# Patient Record
Sex: MALE | Race: WHITE | Employment: OTHER | URBAN - METROPOLITAN AREA
[De-identification: names, ages, dates, MRNs, and addresses within clinical notes are randomized per-mention and may not be internally consistent; named-entity substitution may affect disease eponyms.]

---

## 2018-10-03 ENCOUNTER — ANESTHESIA EVENT (OUTPATIENT)
Dept: ENDOSCOPY | Age: 83
End: 2018-10-03
Payer: MEDICARE

## 2018-10-03 ENCOUNTER — HOSPITAL ENCOUNTER (OUTPATIENT)
Age: 83
Setting detail: OUTPATIENT SURGERY
Discharge: HOME | End: 2018-10-03
Attending: INTERNAL MEDICINE | Admitting: INTERNAL MEDICINE
Payer: MEDICARE

## 2018-10-03 ENCOUNTER — ANESTHESIA (OUTPATIENT)
Dept: ENDOSCOPY | Age: 83
End: 2018-10-03
Payer: MEDICARE

## 2018-10-03 VITALS
WEIGHT: 208 LBS | TEMPERATURE: 97.6 F | BODY MASS INDEX: 29.78 KG/M2 | SYSTOLIC BLOOD PRESSURE: 148 MMHG | HEIGHT: 70 IN | OXYGEN SATURATION: 97 % | RESPIRATION RATE: 16 BRPM | HEART RATE: 73 BPM | DIASTOLIC BLOOD PRESSURE: 81 MMHG

## 2018-10-03 VITALS — SYSTOLIC BLOOD PRESSURE: 127 MMHG | OXYGEN SATURATION: 95 % | DIASTOLIC BLOOD PRESSURE: 73 MMHG

## 2018-10-03 LAB
C DIFFICILE TOXIN, EIA: NORMAL
GLUCOSE BLD-MCNC: 89 MG/DL (ref 70–99)
PERFORMED ON: NORMAL

## 2018-10-03 PROCEDURE — 3609010300 HC COLONOSCOPY W/BIOPSY SINGLE/MULTIPLE: Performed by: INTERNAL MEDICINE

## 2018-10-03 PROCEDURE — 2500000003 HC RX 250 WO HCPCS: Performed by: INTERNAL MEDICINE

## 2018-10-03 PROCEDURE — 3700000001 HC ADD 15 MINUTES (ANESTHESIA): Performed by: INTERNAL MEDICINE

## 2018-10-03 PROCEDURE — 6370000000 HC RX 637 (ALT 250 FOR IP): Performed by: INTERNAL MEDICINE

## 2018-10-03 PROCEDURE — 2709999900 HC NON-CHARGEABLE SUPPLY: Performed by: INTERNAL MEDICINE

## 2018-10-03 PROCEDURE — 2500000003 HC RX 250 WO HCPCS: Performed by: NURSE ANESTHETIST, CERTIFIED REGISTERED

## 2018-10-03 PROCEDURE — 7100000010 HC PHASE II RECOVERY - FIRST 15 MIN: Performed by: INTERNAL MEDICINE

## 2018-10-03 PROCEDURE — 6360000002 HC RX W HCPCS: Performed by: NURSE ANESTHETIST, CERTIFIED REGISTERED

## 2018-10-03 PROCEDURE — 7100000011 HC PHASE II RECOVERY - ADDTL 15 MIN: Performed by: INTERNAL MEDICINE

## 2018-10-03 PROCEDURE — 87449 NOS EACH ORGANISM AG IA: CPT

## 2018-10-03 PROCEDURE — 2580000003 HC RX 258: Performed by: NURSE ANESTHETIST, CERTIFIED REGISTERED

## 2018-10-03 PROCEDURE — 2580000003 HC RX 258: Performed by: FAMILY MEDICINE

## 2018-10-03 PROCEDURE — 88305 TISSUE EXAM BY PATHOLOGIST: CPT

## 2018-10-03 PROCEDURE — 3700000000 HC ANESTHESIA ATTENDED CARE: Performed by: INTERNAL MEDICINE

## 2018-10-03 PROCEDURE — 87324 CLOSTRIDIUM AG IA: CPT

## 2018-10-03 RX ORDER — AMLODIPINE BESYLATE 10 MG/1
10 TABLET ORAL DAILY
COMMUNITY
End: 2019-06-04 | Stop reason: ALTCHOICE

## 2018-10-03 RX ORDER — LIDOCAINE HYDROCHLORIDE 20 MG/ML
INJECTION, SOLUTION INFILTRATION; PERINEURAL PRN
Status: DISCONTINUED | OUTPATIENT
Start: 2018-10-03 | End: 2018-10-03 | Stop reason: SDUPTHER

## 2018-10-03 RX ORDER — SODIUM CHLORIDE 9 MG/ML
INJECTION, SOLUTION INTRAVENOUS CONTINUOUS PRN
Status: DISCONTINUED | OUTPATIENT
Start: 2018-10-03 | End: 2018-10-03 | Stop reason: SDUPTHER

## 2018-10-03 RX ORDER — SODIUM CHLORIDE 9 MG/ML
INJECTION, SOLUTION INTRAVENOUS CONTINUOUS
Status: DISCONTINUED | OUTPATIENT
Start: 2018-10-03 | End: 2018-10-03 | Stop reason: HOSPADM

## 2018-10-03 RX ORDER — PROPOFOL 10 MG/ML
INJECTION, EMULSION INTRAVENOUS PRN
Status: DISCONTINUED | OUTPATIENT
Start: 2018-10-03 | End: 2018-10-03 | Stop reason: SDUPTHER

## 2018-10-03 RX ADMIN — SODIUM CHLORIDE: 9 INJECTION, SOLUTION INTRAVENOUS at 10:15

## 2018-10-03 RX ADMIN — LIDOCAINE HYDROCHLORIDE 50 MG: 20 INJECTION, SOLUTION INFILTRATION; PERINEURAL at 10:33

## 2018-10-03 RX ADMIN — PROPOFOL 100 MG: 10 INJECTION, EMULSION INTRAVENOUS at 10:33

## 2018-10-03 RX ADMIN — LIDOCAINE HYDROCHLORIDE 25 MG: 20 INJECTION, SOLUTION INFILTRATION; PERINEURAL at 10:35

## 2018-10-03 RX ADMIN — PROPOFOL 50 MG: 10 INJECTION, EMULSION INTRAVENOUS at 10:35

## 2018-10-03 RX ADMIN — SODIUM CHLORIDE: 9 INJECTION, SOLUTION INTRAVENOUS at 10:20

## 2018-10-03 ASSESSMENT — PAIN - FUNCTIONAL ASSESSMENT: PAIN_FUNCTIONAL_ASSESSMENT: 0-10

## 2018-10-03 ASSESSMENT — PAIN SCALES - GENERAL
PAINLEVEL_OUTOF10: 0

## 2018-10-03 NOTE — ANESTHESIA PRE PROCEDURE
Department of Anesthesiology  Preprocedure Note       Name:  Javy Barrera   Age:  82 y.o.  :  1935                                          MRN:  [id number]         Date:  10/3/2018      Surgeon: Sarah Singleton):  Sudhir Monge MD    Procedure: Procedure(s):  COLONOSCOPY    Medications prior to admission:   Prior to Admission medications    Medication Sig Start Date End Date Taking? Authorizing Provider   Dihydroxyaluminum Sod Carb (ROLAIDS PO) Take  by mouth. Historical Provider, MD   aspirin 81 MG EC tablet Take 81 mg by mouth daily. Last dose 13    Historical Provider, MD   mesalamine (ASACOL) 400 MG EC tablet Take 800 mg by mouth 3 times daily. Historical Provider, MD       Current medications:    No current facility-administered medications for this encounter.         Allergies:  No Known Allergies    Problem List:    Patient Active Problem List   Diagnosis Code    Diastasis recti O98.65    Umbilical hernia G36.0       Past Medical History:        Diagnosis Date    Cancer ([address]     bladder    Colitis     Diabetes mellitus ([address]     pre-diabetic    GERD (gastroesophageal reflux disease)     Hypertension        Past Surgical History:        Procedure Laterality Date    COLONOSCOPY      CYSTOSCOPY  2010    TUR OF BLADDER TUMOR    CYSTOSCOPY  11-10-10    bladder biopsy with fulguration and cytology    CYSTOSCOPY  2011    with bladder biopsy & fulgeration    CYSTOSCOPY  12    cytology, bladder biopsy with fulguration    CYSTOSCOPY  12    bladder biospy and fulguration    CYSTOSCOPY  13    cytology    CYSTOSCOPY  2014    digital rectal exam    SKIN BIOPSY      mole removed 30 yrs ago    TONSILLECTOMY         Social History:    Social History   Substance Use Topics    Smoking status: Former Smoker     Packs/day: 0.50     Years: 15.00     Quit date: 1980    Smokeless tobacco: Never Used    Alcohol use No                                Counseling given: Not Answered      Vital Signs (Current): There were no vitals filed for this visit. BP Readings from Last 3 Encounters:   04/09/14 133/76   02/07/13 132/85   02/04/13 130/70       NPO Status:                                                                                 BMI:   Wt Readings from Last 3 Encounters:   09/18/18 208 lb (94.3 kg)   04/04/14 212 lb (96.2 kg)   02/07/13 219 lb 5.7 oz (99.5 kg)     There is no height or weight on file to calculate BMI.    CBC:   Lab Results   Component Value Date    WBC 6.8 06/29/2011    RBC 4.57 06/29/2011    HGB 13.3 06/29/2011    HCT 40.0 06/29/2011    MCV 87.5 06/29/2011    RDW 17.9 06/29/2011     06/29/2011       CMP:   Lab Results   Component Value Date     06/29/2011    K 4.8 06/29/2011     06/29/2011    CO2 28 06/29/2011    BUN 18 06/29/2011    CREATININE 1.0 06/29/2011    GFRAA >60 06/29/2011    AGRATIO 1.5 06/29/2011    GLUCOSE 98 06/29/2011    PROT 6.6 06/29/2011    CALCIUM 9.0 06/29/2011    BILITOT 0.40 06/29/2011    ALKPHOS 57 06/29/2011    AST 20 06/29/2011    ALT 28 06/29/2011       POC Tests: No results for input(s): POCGLU, POCNA, POCK, POCCL, POCBUN, POCHEMO, POCHCT in the last 72 hours. Coags: No results found for: PROTIME, INR, APTT    HCG (If Applicable): No results found for: PREGTESTUR, PREGSERUM, HCG, HCGQUANT     ABGs: No results found for: PHART, PO2ART, RBG0YBI, RFE8VDL, BEART, Q0ZXOYUV     Type & Screen (If Applicable):  No results found for: LABABO, LABRH    Anesthesia Evaluation    Airway: Mallampati: II  TM distance: >3 FB   Neck ROM: full  Mouth opening: > = 3 FB Dental:          Pulmonary:                              Cardiovascular:    (+) hypertension:,         Rhythm: regular  Rate: normal                    Neuro/Psych:   (+) neuromuscular disease:,             GI/Hepatic/Renal:   (+) GERD:,           Endo/Other:    (+) Diabetes, .                  Abdominal:   (+)

## 2018-10-03 NOTE — PROGRESS NOTES
Name:  Rachel Chavis  Age:  82 y.o.  CSN:  [id number]            Past Medical History:        Diagnosis Date    Cancer [facility])     bladder    Colitis     Diabetes mellitus ([address]     pre-diabetic    GERD (gastroesophageal reflux disease)     Hypertension        Past Surgical History:      Procedure Laterality Date    COLONOSCOPY      CYSTOSCOPY  07/01/2010    TUR OF BLADDER TUMOR    CYSTOSCOPY  11-10-10    bladder biopsy with fulguration and cytology    CYSTOSCOPY  7/06/2011    with bladder biopsy & fulgeration    CYSTOSCOPY  1-12-12    cytology, bladder biopsy with fulguration    CYSTOSCOPY  8/9/12    bladder biospy and fulguration    CYSTOSCOPY  2/7/13    cytology    CYSTOSCOPY  4/9/2014    digital rectal exam    SKIN BIOPSY      mole removed 30 yrs ago    TONSILLECTOMY         Medications Prior to Admission:  Prescriptions Prior to Admission: amLODIPine (NORVASC) 10 MG tablet, Take 10 mg by mouth daily  Dihydroxyaluminum Sod Carb (ROLAIDS PO), Take  by mouth.    mesalamine (ASACOL) 400 MG EC tablet, Take 750 mg by mouth 3 times daily     Allergies:  Patient has no known allergies. Social History:  Social History     Social History    Marital status:      Spouse name: N/A    Number of children: N/A    Years of education: N/A     Occupational History    Not on file.      Social History Main Topics    Smoking status: Former Smoker     Packs/day: 0.50     Years: 15.00     Quit date: 6/28/1980    Smokeless tobacco: Never Used    Alcohol use No    Drug use: No    Sexual activity: Not on file     Other Topics Concern    Not on file     Social History Narrative    No narrative on file       Family History:  Family History   Problem Relation Age of Onset    Cancer Father     Cancer Brother

## 2018-12-13 ENCOUNTER — OFFICE VISIT (OUTPATIENT)
Dept: ORTHOPEDIC SURGERY | Age: 83
End: 2018-12-13
Payer: MEDICARE

## 2018-12-13 VITALS
HEIGHT: 70 IN | BODY MASS INDEX: 29.92 KG/M2 | DIASTOLIC BLOOD PRESSURE: 86 MMHG | WEIGHT: 209 LBS | SYSTOLIC BLOOD PRESSURE: 155 MMHG | HEART RATE: 69 BPM

## 2018-12-13 DIAGNOSIS — M25.561 RIGHT KNEE PAIN, UNSPECIFIED CHRONICITY: Primary | ICD-10-CM

## 2018-12-13 PROCEDURE — 20610 DRAIN/INJ JOINT/BURSA W/O US: CPT | Performed by: ORTHOPAEDIC SURGERY

## 2018-12-13 PROCEDURE — 99203 OFFICE O/P NEW LOW 30 MIN: CPT | Performed by: ORTHOPAEDIC SURGERY

## 2018-12-13 RX ORDER — TRIAMCINOLONE ACETONIDE 40 MG/ML
40 INJECTION, SUSPENSION INTRA-ARTICULAR; INTRAMUSCULAR ONCE
Status: COMPLETED | OUTPATIENT
Start: 2018-12-13 | End: 2018-12-13

## 2018-12-13 RX ORDER — TADALAFIL 20 MG/1
20 TABLET ORAL
COMMUNITY
Start: 2018-11-05

## 2018-12-13 RX ORDER — AMOXICILLIN 500 MG/1
CAPSULE ORAL
COMMUNITY
Start: 2018-12-04 | End: 2019-06-04 | Stop reason: ALTCHOICE

## 2018-12-13 RX ADMIN — TRIAMCINOLONE ACETONIDE 40 MG: 40 INJECTION, SUSPENSION INTRA-ARTICULAR; INTRAMUSCULAR at 13:25

## 2018-12-13 NOTE — PROGRESS NOTES
CHIEF COMPLAINT: Right knee pain. History:   Zonia Reveles is a 82 y.o. male self-referred for evaluation and treatment of left knee pain / injury. The patient complains of left knee pain. This is evaluated as a personal injury. There was not a history of injury. The pain began 1-2 months ago. The pain is located medial. He describes the symptoms as sharp. He rates pain at 7/10. Symptoms improve with advil. The symptoms are worse with sudden twisting/turning motions. The knee has not given out or felt unstable. The patient can bend and straighten the knee fully. There is no swelling in the knee. There was not catching / locking of the knee. The patient has not had PT. The patient has not had an injection. The patient has taken NSAIDs. The patient has not tried ice. The patient's occupation is retired. Outside reports reviewed: none.     Past Medical History:   Diagnosis Date    Cancer [facility])     bladder    Colitis     Diabetes mellitus [address]     pre-diabetic    GERD (gastroesophageal reflux disease)     Hypertension        Past Surgical History:   Procedure Laterality Date    COLONOSCOPY      COLONOSCOPY  10/3/2018    COLONOSCOPY WITH BIOPSY performed by Bridget Laura MD at [address]  07/01/2010    TUR OF BLADDER TUMOR    CYSTOSCOPY  11-10-10    bladder biopsy with fulguration and cytology    CYSTOSCOPY  7/06/2011    with bladder biopsy & fulgeration    CYSTOSCOPY  1-12-12    cytology, bladder biopsy with fulguration    CYSTOSCOPY  8/9/12    bladder biospy and fulguration    CYSTOSCOPY  2/7/13    cytology    CYSTOSCOPY  4/9/2014    digital rectal exam    SKIN BIOPSY      mole removed 30 yrs ago    TONSILLECTOMY         Family History   Problem Relation Age of Onset    Cancer Father     Cancer Brother        Social History     Social History    Marital status:      Spouse name: N/A    Number of children: N/A    Years of education: N/A     Social History Main Topics    Smoking status: Former Smoker     Packs/day: 0.50     Years: 15.00     Quit date: 6/28/1980    Smokeless tobacco: Never Used    Alcohol use No    Drug use: No    Sexual activity: Not Asked     Other Topics Concern    None     Social History Narrative    None       Current Outpatient Prescriptions   Medication Sig Dispense Refill    tadalafil (CIALIS) 20 MG tablet Take 20 mg by mouth      amoxicillin (AMOXIL) 500 MG capsule       amLODIPine (NORVASC) 10 MG tablet Take 10 mg by mouth daily      Dihydroxyaluminum Sod Carb (ROLAIDS PO) Take  by mouth.  mesalamine (ASACOL) 400 MG EC tablet Take 750 mg by mouth 3 times daily        No current facility-administered medications for this visit. No Known Allergies    Review of Systems:  Pertinent items are noted in HPI. 13 point review of systems from Patient History Form dated 12/13/18 and available in the patient's chart under the Media tab. Physical Examination:     Vital signs:   BP (!) 155/86   Pulse 69   Ht 5' 9.5\" (1.765 m)   Wt 209 lb (94.8 kg)   BMI 30.42 kg/m²     General:  alert, appears stated age, cooperative and no distress   Gait:  Normal. The patient can bear weight on the injured extremity. Right Knee  Alignment:  neutral   ROM:  0 degrees extension to 120 degrees flexion   Bilateral knees   Crepitus:  no   Joint Tenderness:  medial joint line   Effusion:   0 cc   Patellar excursion:  2 of 4 quadrants    Patellar tilt test:  negative   Patellar facet tenderness:  negative medial   negative lateral   Trochlear tenderness:  negative   Anterior drawer test:  negative   Posterior drawer:   negative   Varus laxity at 30 degrees:  negative   Left knee: negative   Valgus laxity at 30 degrees:   negative   Left knee: negative   You's test:  positive   Elizabeth's test:  positive     There is not any cellulitis, lymphedema or cutaneous lesions noted in the lower extremities.  Motor exam of the lower

## 2019-05-28 ENCOUNTER — TELEPHONE (OUTPATIENT)
Dept: CARDIOLOGY CLINIC | Age: 84
End: 2019-05-28

## 2019-05-28 NOTE — TELEPHONE ENCOUNTER
Pt calling wanting to know if he needs to have his 06/04/19 appt moved up based on his CT Scan? Pt is going to drop a copy of the scan off for LES to look over.  Pls call to advise Thank you

## 2019-06-04 ENCOUNTER — OFFICE VISIT (OUTPATIENT)
Dept: CARDIOLOGY CLINIC | Age: 84
End: 2019-06-04
Payer: MEDICARE

## 2019-06-04 VITALS
BODY MASS INDEX: 30.96 KG/M2 | SYSTOLIC BLOOD PRESSURE: 156 MMHG | WEIGHT: 209 LBS | RESPIRATION RATE: 16 BRPM | HEIGHT: 69 IN | DIASTOLIC BLOOD PRESSURE: 74 MMHG | HEART RATE: 80 BPM

## 2019-06-04 DIAGNOSIS — I10 HYPERTENSION, UNSPECIFIED TYPE: ICD-10-CM

## 2019-06-04 DIAGNOSIS — R60.9 EDEMA, UNSPECIFIED TYPE: ICD-10-CM

## 2019-06-04 DIAGNOSIS — R01.1 MURMUR, CARDIAC: ICD-10-CM

## 2019-06-04 DIAGNOSIS — R06.02 SHORTNESS OF BREATH: Primary | ICD-10-CM

## 2019-06-04 DIAGNOSIS — E78.00 PURE HYPERCHOLESTEROLEMIA: ICD-10-CM

## 2019-06-04 DIAGNOSIS — R93.1 HIGH CORONARY ARTERY CALCIUM SCORE: ICD-10-CM

## 2019-06-04 PROCEDURE — 99203 OFFICE O/P NEW LOW 30 MIN: CPT | Performed by: INTERNAL MEDICINE

## 2019-06-04 RX ORDER — HYDROCHLOROTHIAZIDE 25 MG/1
25 TABLET ORAL DAILY PRN
Status: ON HOLD | COMMUNITY
Start: 2019-04-18 | End: 2020-01-14 | Stop reason: ALTCHOICE

## 2019-06-04 RX ORDER — AMLODIPINE BESYLATE 10 MG/1
10 TABLET ORAL NIGHTLY
COMMUNITY
Start: 2019-01-24

## 2019-06-04 NOTE — PROGRESS NOTES
engorged  · The carotid upstroke is normal in amplitude and contour without delay or bruit  · Peripheral pulses are symmetrical and full  · There is no clubbing, cyanosis of the extremities. · JAIME ankle edema, left worse than right   · Femoral Arteries: 2+ and equal  · Pedal Pulses: 2+ and equal   Abdomen:  · No masses or tenderness  · Liver/Spleen: No Abnormalities Noted  Neurological/Psychiatric:  · Alert and oriented in all spheres  · Moves all extremities well  · Exhibits normal gait balance and coordination  · No abnormalities of mood, affect, memory, mentation, or behavior are noted      Assessment:    Elevated CT Calcium Score   -order stress echo       Hypertension  Blood pressure (!) 156/74, pulse 80, resp. rate 16, height 5' 9\" (1.753 m), weight 209 lb (94.8 kg). stable    Diabetes   Managed by PCP         Plan:    Lab results reviewed this visit and discussed. Stress Echo   Continue risk factor modifications. Call for any change in symptoms. Return for regular follow up in 3 months. I appreciate the opportunity of cooperating in the care of this individual.    Madhuri Escalera. Christiano Modi M.D., [address] attestation: This note was scribed in the presence of Dr. Christiano Modi MD, by Christopher Darby RN. The scribe's documentation has been prepared under my direction and personally reviewed by me in its entirety. I confirm that the note above accurately reflects all work, treatment, procedures, and medical decision making performed by me.

## 2019-07-01 ENCOUNTER — HOSPITAL ENCOUNTER (OUTPATIENT)
Dept: NON INVASIVE DIAGNOSTICS | Age: 84
Discharge: HOME | End: 2019-07-01
Payer: MEDICARE

## 2019-07-01 DIAGNOSIS — R60.9 EDEMA, UNSPECIFIED TYPE: ICD-10-CM

## 2019-07-01 DIAGNOSIS — R06.02 SHORTNESS OF BREATH: ICD-10-CM

## 2019-07-01 DIAGNOSIS — R93.1 HIGH CORONARY ARTERY CALCIUM SCORE: ICD-10-CM

## 2019-07-01 DIAGNOSIS — I10 HYPERTENSION, UNSPECIFIED TYPE: ICD-10-CM

## 2019-07-01 DIAGNOSIS — R01.1 MURMUR, CARDIAC: ICD-10-CM

## 2019-07-01 LAB
LV EF: 50 %
LVEF MODALITY: NORMAL

## 2019-07-01 PROCEDURE — 93351 STRESS TTE COMPLETE: CPT

## 2019-07-01 PROCEDURE — 93320 DOPPLER ECHO COMPLETE: CPT

## 2019-07-03 ENCOUNTER — TELEPHONE (OUTPATIENT)
Dept: CARDIOLOGY CLINIC | Age: 84
End: 2019-07-03

## 2019-12-19 NOTE — PROGRESS NOTES
DATE_1/7/2020_______ TIME_0730_______ARRIVAL_0600________      Nothing to eat or drink after midnight the night before,except for what the prep instructions call for. If you do not have the instructions or do not understand them please contact your doctors office. Follow any instructions your doctors office has given you including what medications to take the AM of your procedure and which ones to hold. You may use your inhalers. If you take a long acting insulin the gale prior please cut the dose in half and take no diabetic medications that AM.Follow specific doctors office instructions regarding blood thinners and if they want you to hold and for how long. If you are on a blood thinner and have no instructions please contact the office and ask. Dress comfortably,bring your insurance card,picture ID,and a complete list of medications, including supplements. You must have a responsible adult to stay with you during the procedure,drive you home and stay with you. [facility] phone number [phone] for any questions.

## 2020-01-07 ENCOUNTER — HOSPITAL ENCOUNTER (OUTPATIENT)
Age: 85
Setting detail: OUTPATIENT SURGERY
Discharge: HOME | End: 2020-01-07
Attending: INTERNAL MEDICINE | Admitting: INTERNAL MEDICINE
Payer: MEDICARE

## 2020-01-07 VITALS
HEIGHT: 70 IN | WEIGHT: 205 LBS | OXYGEN SATURATION: 97 % | TEMPERATURE: 97.8 F | HEART RATE: 75 BPM | SYSTOLIC BLOOD PRESSURE: 136 MMHG | BODY MASS INDEX: 29.35 KG/M2 | RESPIRATION RATE: 18 BRPM | DIASTOLIC BLOOD PRESSURE: 91 MMHG

## 2020-01-07 PROCEDURE — 2580000003 HC RX 258: Performed by: ANESTHESIOLOGY

## 2020-01-07 RX ORDER — SODIUM CHLORIDE 0.9 % (FLUSH) 0.9 %
10 SYRINGE (ML) INJECTION PRN
Status: DISCONTINUED | OUTPATIENT
Start: 2020-01-07 | End: 2020-01-08 | Stop reason: HOSPADM

## 2020-01-07 RX ORDER — SODIUM CHLORIDE 9 MG/ML
INJECTION, SOLUTION INTRAVENOUS CONTINUOUS
Status: DISCONTINUED | OUTPATIENT
Start: 2020-01-07 | End: 2020-01-08 | Stop reason: HOSPADM

## 2020-01-07 RX ORDER — SODIUM CHLORIDE 0.9 % (FLUSH) 0.9 %
10 SYRINGE (ML) INJECTION EVERY 12 HOURS SCHEDULED
Status: DISCONTINUED | OUTPATIENT
Start: 2020-01-07 | End: 2020-01-08 | Stop reason: HOSPADM

## 2020-01-07 RX ORDER — LIDOCAINE HYDROCHLORIDE 10 MG/ML
1 INJECTION, SOLUTION EPIDURAL; INFILTRATION; INTRACAUDAL; PERINEURAL
Status: DISCONTINUED | OUTPATIENT
Start: 2020-01-07 | End: 2020-01-07 | Stop reason: HOSPADM

## 2020-01-07 RX ADMIN — SODIUM CHLORIDE: 9 INJECTION, SOLUTION INTRAVENOUS at 06:25

## 2020-01-07 ASSESSMENT — PAIN - FUNCTIONAL ASSESSMENT: PAIN_FUNCTIONAL_ASSESSMENT: 0-10

## 2020-01-07 NOTE — PROGRESS NOTES
Name:  Armaan Genao  Age:  82 y.o.  CSN:  [id number]            Past Medical History:        Diagnosis Date    Cancer [facility])     bladder    Colitis     GERD (gastroesophageal reflux disease)     Hypertension        Past Surgical History:      Procedure Laterality Date    COLONOSCOPY      COLONOSCOPY  10/3/2018    COLONOSCOPY WITH BIOPSY [address] Ovidio Francis MD [address]  07/01/2010    TUR OF BLADDER TUMOR    CYSTOSCOPY  11-10-10    bladder biopsy with fulguration and cytology    CYSTOSCOPY  7/06/2011    with bladder biopsy & fulgeration    CYSTOSCOPY  1-12-12    cytology, bladder biopsy with fulguration    CYSTOSCOPY  8/9/12    bladder biospy and fulguration    CYSTOSCOPY  2/7/13    cytology    CYSTOSCOPY  4/9/2014    digital rectal exam    SKIN BIOPSY      mole removed 30 yrs ago    TONSILLECTOMY         Medications Prior to Admission:  Medications Prior to Admission: hydrochlorothiazide (HYDRODIURIL) 25 MG tablet, Take 25 mg by mouth daily as needed   amLODIPine (NORVASC) 10 MG tablet, Take 10 mg by mouth nightly  tadalafil (CIALIS) 20 MG tablet, Take 20 mg by mouth  AzaTHIOprine (IMURAN PO), Take 50 mg by mouth daily   Dihydroxyaluminum Sod Carb (ROLAIDS PO), Take  by mouth. Allergies:  Patient has no known allergies.     Social History:  Social History     Socioeconomic History    Marital status:      Spouse name: Not on file    Number of children: Not on file    Years of education: Not on file    Highest education level: Not on file   Occupational History    Occupation: Retired   Social Needs    Financial resource strain: Not on file    Food insecurity:     Worry: Not on file     Inability: Not on file   [employer] needs:     Medical: Not on file     Non-medical: Not on file   Tobacco Use    Smoking status: Former Smoker     Packs/day: 0.50     Years: 15.00     Pack years: 7.50     Last attempt to quit: 6/28/1980     Years since quitting:

## 2020-01-08 ENCOUNTER — ANESTHESIA EVENT (OUTPATIENT)
Dept: ENDOSCOPY | Age: 85
End: 2020-01-08
Payer: MEDICARE

## 2020-01-08 ENCOUNTER — OFFICE VISIT (OUTPATIENT)
Dept: CARDIOLOGY CLINIC | Age: 85
End: 2020-01-08
Payer: MEDICARE

## 2020-01-08 VITALS
WEIGHT: 209 LBS | SYSTOLIC BLOOD PRESSURE: 152 MMHG | HEART RATE: 71 BPM | BODY MASS INDEX: 30.96 KG/M2 | DIASTOLIC BLOOD PRESSURE: 72 MMHG | HEIGHT: 69 IN | OXYGEN SATURATION: 93 %

## 2020-01-08 PROCEDURE — 99214 OFFICE O/P EST MOD 30 MIN: CPT | Performed by: NURSE PRACTITIONER

## 2020-01-08 NOTE — COMMUNICATION BODY
[facility]     Outpatient Follow Up Note    Mariluz Cedillo is 82 y.o. male who presents today with a history of DOZIER with positive calcium score and HTN      CHIEF COMPLAINT / HPI:  Follow Up secondary to positive calcium score with stress echo: neg / normal study    Subjective:   he denies significant chest pain. There is no SOB/DOZIER. The patient denies orthopnea/PND. The patient has swelling in his ankles at times for which he takes HCTZ prn. He hasn't used any in about 2 weeks. The patients weight is down 3#. The patient is not experiencing palpitations or dizziness. These symptoms show no change since the last OV. His home BP runs 142-145/80-85  With regard to medication therapy the patient has been compliant with prescribed regimen. They have tolerated therapy to date.      Current Outpatient Medications   Medication Sig Dispense Refill    amLODIPine (NORVASC) 10 MG tablet Take 10 mg by mouth nightly      tadalafil (CIALIS) 20 MG tablet Take 20 mg by mouth      AzaTHIOprine (IMURAN PO) Take 50 mg by mouth daily       Dihydroxyaluminum Sod Carb (ROLAIDS PO) Take 1 tablet by mouth 2 times daily as needed       hydrochlorothiazide (HYDRODIURIL) 25 MG tablet Take 25 mg by mouth daily as needed            Objective:   PHYSICAL EXAM:    Vitals:    01/08/20 1352 01/08/20 1400 01/08/20 1404 01/08/20 1422   BP: (!) 150/70 (!) 150/80 (!) 140/80 (!) 152/72   Site: Right Upper Arm Right Upper Arm Left Upper Arm    Position: Sitting Sitting Sitting    Cuff Size: Medium Adult Medium Adult Medium Adult    Pulse: 71 71     SpO2: 96% 93%     Weight: 209 lb (94.8 kg)      Height: 5' 9\" (1.753 m)            VITALS:  BP (!) 140/80 (Site: Left Upper Arm, Position: Sitting, Cuff Size: Medium Adult)   Pulse 71   Ht 5' 9\" (1.753 m)   Wt 209 lb (94.8 kg)   SpO2 93%   BMI 30.86 kg/m²    CONSTITUTIONAL: Cooperative, no apparent distress, and appears well nourished / developed  NEUROLOGIC:  Awake and orientated to participate in the care of Margie Bhatia.     Angelica Ariza, JUN

## 2020-01-08 NOTE — LETTER
SpO2: 96% 93%     Weight: 209 lb (94.8 kg)      Height: 5' 9\" (1.753 m)            VITALS:  BP (!) 140/80 (Site: Left Upper Arm, Position: Sitting, Cuff Size: Medium Adult)   Pulse 71   Ht 5' 9\" (1.753 m)   Wt 209 lb (94.8 kg)   SpO2 93%   BMI 30.86 kg/m²    CONSTITUTIONAL: Cooperative, no apparent distress, and appears well nourished / developed  NEUROLOGIC:  Awake and orientated to person, place and time. PSYCH: Calm affect. SKIN: Warm and dry. HEENT: Sclera non-icteric, normocephalic, neck supple, no elevation of JVP, normal carotid pulses with no bruits and thyroid normal size. LUNGS:  No increased work of breathing and clear to auscultation, no crackles or wheezing  CARDIOVASCULAR:  Regular rate and rhythm with no murmurs, gallops, rubs, or abnormal heart sounds, normal PMI. The apical impulses not displaced  JVP less than 8 cm H2O  Heart tones are crisp and normal  Cervical veins are not engorged  The carotid upstroke is normal in amplitude and contour without delay or bruit  JVP is not elevated  ABDOMEN:  Normal bowel sounds, non-distended and non-tender to palpation  EXT: No edema, no calf tenderness. Pulses are present bilaterally. DATA:      LABS: 2/21/19:    trig 131 HDL 46      10/17/19:   Na+ 139 K+ 4.0 chl 102 CO2 28 BUN 22 creatinine 0.99 glu 85    Radiology Review:  Pertinent images / reports were reviewed as a part of this visit and reveals the following:    Calcium score: May '19:  Calcium score of 546.57. This value implies an extensive coronary plaque burden. There is a high likelihood of at least one significant coronary stenosis. Calcium score: The patient's total calcium score is 546.57. This score represents a summation of individual vessel calcium scores of :  96.97 in the left main  37.35 in the right coronary artery  147.62 in the left anterior descending artery  264.64 in the left circumflex  0 in the posterior descending artery.

## 2020-01-08 NOTE — PROGRESS NOTES
Name_______________________________________Printed:____________________  Date and time of surgery___1/14/2020  0730_____________________Arrival Time:____FEC  0600____________   1. Do not eat or drink anything after 12 midnight (or____hours) prior to surgery. This includes no water, chewing gum or mints. Endoscopy patients follow your doctors bowel prep instructions,which may include taking part of prep after midnight If you have been instructed on ERAS or carb loading -please follow those instructions. 2. Take the following pills with a small sip of water on the morning of surgery______amlodipine_(take on regular schedule HS or AM)____________________________________________                 3. Aspirin, Ibuprofen, Advil, Naproxen, Vitamin E and other Anti-inflammatory products and supplements should be stopped for 5 -7days before surgery or as directed by your physician. 4. Check with your Doctor regarding stopping Plavix, Coumadin,Eliquis, Lovenox,Effient,Pradaxa,Xarelto, Fragmin or other blood thinners and follow their instructions. 5. Do not smoke, and do not drink any alcoholic beverages 24 hours prior to surgery. This includes NA Beer. Refrain from the usage of any recreational drugs. 6. You may brush your teeth and gargle the morning of surgery. DO NOT SWALLOW WATER   7. You MUST make arrangements for a responsible adult to stay on site while you are here and take you home after your surgery. You will not be allowed to leave alone or drive yourself home. It is strongly suggested someone stay with you the first 24 hrs. Your surgery will be cancelled if you do not have a ride home. 8. A parent/legal guardian must accompany a child scheduled for surgery and plan to stay at the hospital until the child is discharged. Please do not bring other children with you.    9. Please wear simple, loose fitting clothing to the hospital.  Do not bring valuables (money, credit cards, checkbooks, etc.) Do not wear any makeup (including no eye makeup) or nail polish on your fingers or toes. 10. DO NOT wear any jewelry or piercings on day of surgery. All body piercing jewelry must be removed. 11. If you have ___dentures, they will be removed before going to the OR; we will provide you a container. If you wear ___contact lenses or ___glasses, they will be removed; please bring a case for them. 12. Please see your family doctor/pediatrician for a history & physical and/or concerning medications. Bring any test results/reports from your physician's office. PCP__________________Phone___________H&P Appt. Date________             13 If you  have a Living Will and Durable Power of  for Healthcare, please bring in a copy. 15. Notify your Surgeon if you develop any illness between now and surgery  time, cough, cold, fever, sore throat, nausea, vomiting, etc.  Please notify your surgeon if you experience dizziness, shortness of breath or blurred vision between now & the time of your surgery             15. DO NOT shave your operative site 96 hours prior to surgery. For face & neck surgery, men may use an electric razor 48 hours prior to surgery. 16. Shower the night before or morning of surgery as directed. 17. To provide excellent care visitors will be limited to one in the room at any given time. 18.  Please bring picture ID and insurance card. 19.  Visit our web site for additional information:  [email or web address]              20.During flu season no children under the age of 15 are permitted in the hospital for the safety of all patients.                               21. If you take a long acting insulin in the evening only  take half of your usual  dose the night  before your procedure              22. If you use a c-pap please bring DOS if staying overnight,             23.For your convenience [facility] has a pharmacy on site

## 2020-01-08 NOTE — PROGRESS NOTES
[facility]     Outpatient Follow Up Note    Andrade Peace is 82 y.o. male who presents today with a history of DOZIER with positive calcium score and HTN      CHIEF COMPLAINT / HPI:  Follow Up secondary to positive calcium score with stress echo: neg / normal study    Subjective:   he denies significant chest pain. There is no SOB/DOZIER. The patient denies orthopnea/PND. The patient has swelling in his ankles at times for which he takes HCTZ prn. He hasn't used any in about 2 weeks. The patients weight is down 3#. The patient is not experiencing palpitations or dizziness. These symptoms show no change since the last OV. His home BP runs 142-145/80-85  With regard to medication therapy the patient has been compliant with prescribed regimen. They have tolerated therapy to date. Past Medical History:   Diagnosis Date    Cancer ([facility] [address]     bladder    Colitis     GERD (gastroesophageal reflux disease)     Hypertension      Social History:    Social History     Tobacco Use   Smoking Status Former Smoker    Packs/day: 0.50    Years: 15.00    Pack years: 7.50    Last attempt to quit: 1980    Years since quittin.5   Smokeless Tobacco Never Used     Current Medications:  Current Outpatient Medications   Medication Sig Dispense Refill    amLODIPine (NORVASC) 10 MG tablet Take 10 mg by mouth nightly      tadalafil (CIALIS) 20 MG tablet Take 20 mg by mouth      AzaTHIOprine (IMURAN PO) Take 50 mg by mouth daily       Dihydroxyaluminum Sod Carb (ROLAIDS PO) Take 1 tablet by mouth 2 times daily as needed       hydrochlorothiazide (HYDRODIURIL) 25 MG tablet Take 25 mg by mouth daily as needed        No current facility-administered medications for this visit. REVIEW OF SYSTEMS:    CONSTITUTIONAL: No major weight gain or loss, fatigue, weakness, night sweats or fever. HEENT: No new vision difficulties or ringing in the ears. RESPIRATORY: No new SOB, PND, orthopnea or cough.    CARDIOVASCULAR: See HPI  GI: No nausea, vomiting, diarrhea, constipation, abdominal pain or changes in bowel habits >> hx of colitis . : No urinary frequency, urgency, incontinence hematuria or dysuria. SKIN: No cyanosis or skin lesions. MUSCULOSKELETAL: No new muscle or joint pain. NEUROLOGICAL: No syncope or TIA-like symptoms. PSYCHIATRIC: No anxiety, pain, insomnia or depression    Objective:   PHYSICAL EXAM:    Vitals:    01/08/20 1352 01/08/20 1400 01/08/20 1404 01/08/20 1422   BP: (!) 150/70 (!) 150/80 (!) 140/80 (!) 152/72   Site: Right Upper Arm Right Upper Arm Left Upper Arm    Position: Sitting Sitting Sitting    Cuff Size: Medium Adult Medium Adult Medium Adult    Pulse: 71 71     SpO2: 96% 93%     Weight: 209 lb (94.8 kg)      Height: 5' 9\" (1.753 m)            VITALS:  BP (!) 140/80 (Site: Left Upper Arm, Position: Sitting, Cuff Size: Medium Adult)   Pulse 71   Ht 5' 9\" (1.753 m)   Wt 209 lb (94.8 kg)   SpO2 93%   BMI 30.86 kg/m²   CONSTITUTIONAL: Cooperative, no apparent distress, and appears well nourished / developed  NEUROLOGIC:  Awake and orientated to person, place and time. PSYCH: Calm affect. SKIN: Warm and dry. HEENT: Sclera non-icteric, normocephalic, neck supple, no elevation of JVP, normal carotid pulses with no bruits and thyroid normal size. LUNGS:  No increased work of breathing and clear to auscultation, no crackles or wheezing  CARDIOVASCULAR:  Regular rate and rhythm with no murmurs, gallops, rubs, or abnormal heart sounds, normal PMI. The apical impulses not displaced  JVP less than 8 cm H2O  Heart tones are crisp and normal  Cervical veins are not engorged  The carotid upstroke is normal in amplitude and contour without delay or bruit  JVP is not elevated  ABDOMEN:  Normal bowel sounds, non-distended and non-tender to palpation  EXT: No edema, no calf tenderness. Pulses are present bilaterally.     DATA:      LABS: 2/21/19:    trig 131 HDL 46      10/17/19:   Na+ 139 K+ 4.0 chl 102 CO2 28 BUN 22 creatinine 0.99 glu 85    Radiology Review:  Pertinent images / reports were reviewed as a part of this visit and reveals the following:    Calcium score: May '19:  Calcium score of 546.57. This value implies an extensive coronary plaque burden. There is a high likelihood of at least one significant coronary stenosis. Calcium score: The patient's total calcium score is 546.57. This score represents a summation of individual vessel calcium scores of :  96.97 in the left main  37.35 in the right coronary artery  147.62 in the left anterior descending artery  264.64 in the left circumflex  0 in the posterior descending artery. The calcium score MS  809.71 implies extensive coronary plaque burden and a high likelihood of at least one significant coronary stenosis. .    Stress echo: July '19:  Summary   Normal stress echocardiogram study. Rest      ECG   Normal sinus rhythm. Stress   Stress Type: Exercise   Stress Protocol: Marty      Rest HR: 80 bpm                           HR BP Product: 28824   Rest BP: 124/70 mmHg                      Max Exercise: 5.4 METS   Stress Peak HR: 131 bpm   Stress Peak BP: 169/65 mmHg   Predicted HR: 138 bpm   % of predicted HR: 95   Test Duration: 4 min and 44 sec   Reason for Termination: Target heart rate      Results      Echo (rest): Normal (LVEF >50%)      Echo (stress): Hyperkinetic (LVEF >70%)      Echo   Baseline resting echocardiogram shows normal global LV systolic function   with an ejection fraction of 60% and uniform myocardial segmental wall   motion. Following stress there was uniform augmentation of all myocardial   segments with appropriate hyperdynamic LV systolic response to stress. ECG   Normal (Negative) response to exercise. Arrhythmias   Rare premature ventricular contractions. Echo: Summary   Normal left ventricle size and systolic function with an estimated ejection   fraction of 55%.    Borderline concentric left

## 2020-01-14 ENCOUNTER — ANESTHESIA (OUTPATIENT)
Dept: ENDOSCOPY | Age: 85
End: 2020-01-14
Payer: MEDICARE

## 2020-01-14 ENCOUNTER — HOSPITAL ENCOUNTER (OUTPATIENT)
Age: 85
Setting detail: OUTPATIENT SURGERY
Discharge: HOME | End: 2020-01-14
Attending: INTERNAL MEDICINE | Admitting: INTERNAL MEDICINE
Payer: MEDICARE

## 2020-01-14 VITALS
SYSTOLIC BLOOD PRESSURE: 134 MMHG | OXYGEN SATURATION: 96 % | DIASTOLIC BLOOD PRESSURE: 76 MMHG | TEMPERATURE: 97.6 F | HEART RATE: 65 BPM | BODY MASS INDEX: 29.35 KG/M2 | RESPIRATION RATE: 16 BRPM | HEIGHT: 70 IN | WEIGHT: 205 LBS

## 2020-01-14 VITALS — OXYGEN SATURATION: 95 % | SYSTOLIC BLOOD PRESSURE: 108 MMHG | DIASTOLIC BLOOD PRESSURE: 68 MMHG

## 2020-01-14 PROCEDURE — 88305 TISSUE EXAM BY PATHOLOGIST: CPT

## 2020-01-14 PROCEDURE — 88342 IMHCHEM/IMCYTCHM 1ST ANTB: CPT

## 2020-01-14 PROCEDURE — 2580000003 HC RX 258: Performed by: FAMILY MEDICINE

## 2020-01-14 PROCEDURE — 3609008300 HC SIGMOIDOSCOPY W/BIOPSY SINGLE/MULTIPLE: Performed by: INTERNAL MEDICINE

## 2020-01-14 PROCEDURE — 7100000010 HC PHASE II RECOVERY - FIRST 15 MIN: Performed by: INTERNAL MEDICINE

## 2020-01-14 PROCEDURE — 2709999900 HC NON-CHARGEABLE SUPPLY: Performed by: INTERNAL MEDICINE

## 2020-01-14 PROCEDURE — 3700000000 HC ANESTHESIA ATTENDED CARE: Performed by: INTERNAL MEDICINE

## 2020-01-14 PROCEDURE — 6360000002 HC RX W HCPCS: Performed by: NURSE ANESTHETIST, CERTIFIED REGISTERED

## 2020-01-14 PROCEDURE — 7100000011 HC PHASE II RECOVERY - ADDTL 15 MIN: Performed by: INTERNAL MEDICINE

## 2020-01-14 PROCEDURE — 2500000003 HC RX 250 WO HCPCS: Performed by: NURSE ANESTHETIST, CERTIFIED REGISTERED

## 2020-01-14 RX ORDER — SODIUM CHLORIDE 9 MG/ML
INJECTION, SOLUTION INTRAVENOUS CONTINUOUS
Status: DISCONTINUED | OUTPATIENT
Start: 2020-01-14 | End: 2020-01-14 | Stop reason: HOSPADM

## 2020-01-14 RX ORDER — PROPOFOL 10 MG/ML
INJECTION, EMULSION INTRAVENOUS PRN
Status: DISCONTINUED | OUTPATIENT
Start: 2020-01-14 | End: 2020-01-14 | Stop reason: SDUPTHER

## 2020-01-14 RX ORDER — LIDOCAINE HYDROCHLORIDE 20 MG/ML
INJECTION, SOLUTION INFILTRATION; PERINEURAL PRN
Status: DISCONTINUED | OUTPATIENT
Start: 2020-01-14 | End: 2020-01-14 | Stop reason: SDUPTHER

## 2020-01-14 RX ADMIN — SODIUM CHLORIDE: 9 INJECTION, SOLUTION INTRAVENOUS at 06:49

## 2020-01-14 RX ADMIN — LIDOCAINE HYDROCHLORIDE 100 MG: 20 INJECTION, SOLUTION INFILTRATION; PERINEURAL at 07:23

## 2020-01-14 RX ADMIN — PROPOFOL 40 MG: 10 INJECTION, EMULSION INTRAVENOUS at 07:23

## 2020-01-14 RX ADMIN — PROPOFOL 20 MG: 10 INJECTION, EMULSION INTRAVENOUS at 07:29

## 2020-01-14 RX ADMIN — PROPOFOL 40 MG: 10 INJECTION, EMULSION INTRAVENOUS at 07:26

## 2020-01-14 ASSESSMENT — PAIN SCALES - GENERAL
PAINLEVEL_OUTOF10: 0

## 2020-01-14 ASSESSMENT — PAIN - FUNCTIONAL ASSESSMENT: PAIN_FUNCTIONAL_ASSESSMENT: 0-10

## 2020-01-14 NOTE — PROGRESS NOTES
Name:  Tami Guerrero  Age:  82 y.o.  CSN:  [id number]            Past Medical History:        Diagnosis Date    Cancer [facility])     bladder    Colitis     GERD (gastroesophageal reflux disease)     Hypertension        Past Surgical History:      Procedure Laterality Date    COLONOSCOPY      COLONOSCOPY  10/3/2018    COLONOSCOPY WITH BIOPSY performed by Jaziel Toussaint MD at [address]  2010    TUR OF BLADDER TUMOR    CYSTOSCOPY  11-10-10    bladder biopsy with fulguration and cytology    CYSTOSCOPY  2011    with bladder biopsy & fulgeration    CYSTOSCOPY  12    cytology, bladder biopsy with fulguration    CYSTOSCOPY  12    bladder biospy and fulguration    CYSTOSCOPY  13    cytology    CYSTOSCOPY  2014    digital rectal exam    SKIN BIOPSY      mole removed 30 yrs ago    TONSILLECTOMY         Medications Prior to Admission:  Medications Prior to Admission: amLODIPine (NORVASC) 10 MG tablet, Take 10 mg by mouth nightly  Dihydroxyaluminum Sod Carb (ROLAIDS PO), Take 1 tablet by mouth 2 times daily as needed   tadalafil (CIALIS) 20 MG tablet, Take 20 mg by mouth  AzaTHIOprine (IMURAN PO), Take 50 mg by mouth daily     Allergies:  Patient has no known allergies.     Social History:  Social History     Socioeconomic History    Marital status:      Spouse name: Not on file    Number of children: Not on file    Years of education: Not on file    Highest education level: Not on file   Occupational History    Occupation: Retired   Social Needs    Financial resource strain: Not on file    Food insecurity:     Worry: Not on file     Inability: Not on file   [employer] needs:     Medical: Not on file     Non-medical: Not on file   Tobacco Use    Smoking status: Former Smoker     Packs/day: 0.50     Years: 15.00     Pack years: 7.50     Last attempt to quit: 1980     Years since quittin.5    Smokeless tobacco: Never Used   Substance and

## 2020-01-14 NOTE — ANESTHESIA PRE PROCEDURE
Department of Anesthesiology  Preprocedure Note       Name:  Roman Barrientos   Age:  82 y.o.  :  1935                                          MRN:  [id number]         Date:  2020      Surgeon: Octavio Gonzalez):  Charles De La Rosa MD    Procedure: COLONOSCOPY DIAGNOSTIC (N/A )    Medications prior to admission:   Prior to Admission medications    Medication Sig Start Date End Date Taking?  Authorizing Provider   amLODIPine (NORVASC) 10 MG tablet Take 10 mg by mouth nightly 19   Historical Provider, MD   tadalafil (CIALIS) 20 MG tablet Take 20 mg by mouth 18   Historical Provider, MD   AzaTHIOprine (IMURAN PO) Take 50 mg by mouth daily     Historical Provider, MD   Dihydroxyaluminum Sod Carb (ROLAIDS PO) Take 1 tablet by mouth 2 times daily as needed     Historical Provider, MD       Current medications:    Current Facility-Administered Medications   Medication Dose Route Frequency Provider Last Rate Last Dose    0.9 % sodium chloride infusion   Intravenous Continuous Mary Spence MD           Allergies:  No Known Allergies    Problem List:    Patient Active Problem List   Diagnosis Code    Diastasis recti Y74.47    Umbilical hernia R89.0       Past Medical History:        Diagnosis Date    Cancer ([facility] [address]     bladder    Colitis     GERD (gastroesophageal reflux disease)     Hypertension        Past Surgical History:        Procedure Laterality Date    COLONOSCOPY      COLONOSCOPY  10/3/2018    [address] Charles De La [address]  [address]    TUR OF BLADDER TUMOR    CYSTOSCOPY  11-10-10    bladder biopsy with fulguration and cytology    CYSTOSCOPY  2011    with bladder biopsy & fulgeration    CYSTOSCOPY  12    cytology, bladder biopsy with fulguration    CYSTOSCOPY  12    bladder biospy and fulguration    CYSTOSCOPY  13    cytology    CYSTOSCOPY  2014    digital rectal exam    SKIN BIOPSY      mole removed 30

## 2020-03-24 ENCOUNTER — OFFICE VISIT (OUTPATIENT)
Dept: ORTHOPEDIC SURGERY | Age: 85
End: 2020-03-24
Payer: MEDICARE

## 2020-03-24 VITALS — HEIGHT: 70 IN | TEMPERATURE: 97.3 F | WEIGHT: 214.8 LBS | BODY MASS INDEX: 30.75 KG/M2

## 2020-03-24 PROCEDURE — 99213 OFFICE O/P EST LOW 20 MIN: CPT | Performed by: ORTHOPAEDIC SURGERY

## 2020-03-24 PROCEDURE — 20610 DRAIN/INJ JOINT/BURSA W/O US: CPT | Performed by: ORTHOPAEDIC SURGERY

## 2020-03-24 RX ORDER — TRIAMCINOLONE ACETONIDE 40 MG/ML
40 INJECTION, SUSPENSION INTRA-ARTICULAR; INTRAMUSCULAR ONCE
Status: COMPLETED | OUTPATIENT
Start: 2020-03-24 | End: 2020-03-24

## 2020-03-24 RX ORDER — LIDOCAINE HYDROCHLORIDE 10 MG/ML
4 INJECTION, SOLUTION INFILTRATION; PERINEURAL ONCE
Status: COMPLETED | OUTPATIENT
Start: 2020-03-24 | End: 2020-03-24

## 2020-03-24 RX ORDER — BUPIVACAINE HYDROCHLORIDE 2.5 MG/ML
4 INJECTION, SOLUTION INFILTRATION; PERINEURAL ONCE
Status: COMPLETED | OUTPATIENT
Start: 2020-03-24 | End: 2020-03-24

## 2020-03-24 RX ADMIN — TRIAMCINOLONE ACETONIDE 40 MG: 40 INJECTION, SUSPENSION INTRA-ARTICULAR; INTRAMUSCULAR at 09:17

## 2020-03-24 RX ADMIN — LIDOCAINE HYDROCHLORIDE 4 ML: 10 INJECTION, SOLUTION INFILTRATION; PERINEURAL at 09:16

## 2020-03-24 RX ADMIN — BUPIVACAINE HYDROCHLORIDE 10 MG: 2.5 INJECTION, SOLUTION INFILTRATION; PERINEURAL at 09:15

## 2020-03-24 NOTE — PROGRESS NOTES
SIGMOIDOSCOPY BIOPSY FLEXIBLE performed by Chanel Lee MD at [address] [facility]      mole removed 27 yrs ago    TONSILLECTOMY         Family History   Problem Relation Age of Onset    Cancer Father         Melanoma    Colon Cancer Brother        Social History     Socioeconomic History    Marital status:      Spouse name: None    Number of children: None    Years of education: None    Highest education level: None   Occupational History    Occupation: Retired   Social Needs    Financial resource strain: None    Food insecurity     Worry: None     Inability: None    Transportation needs     Medical: None     Non-medical: None   Tobacco Use    Smoking status: Former Smoker     Packs/day: 0.50     Years: 15.00     Pack years: 7.50     Last attempt to quit: 1980     Years since quittin.7    Smokeless tobacco: Never Used   Substance and Sexual Activity    Alcohol use: No    Drug use: No    Sexual activity: None   Lifestyle    Physical activity     Days per week: None     Minutes per session: None    Stress: None   Relationships    Social connections     Talks on phone: None     Gets together: None     Attends Islam service: None     Active member of club or organization: None     Attends meetings of clubs or organizations: None     Relationship status: None    Intimate partner violence     Fear of current or ex partner: None     Emotionally abused: None     Physically abused: None     Forced sexual activity: None   Other Topics Concern    None   Social History Narrative    None       Current Outpatient Medications   Medication Sig Dispense Refill    amLODIPine (NORVASC) 10 MG tablet Take 10 mg by mouth nightly      tadalafil (CIALIS) 20 MG tablet Take 20 mg by mouth      AzaTHIOprine (IMURAN PO) Take 50 mg by mouth daily       Dihydroxyaluminum Sod Carb (ROLAIDS PO) Take 1 tablet by mouth 2 times daily as needed        No current facility-administered medications for this visit. No Known Allergies    Review of Systems:  Pertinent items are noted in HPI. 13 point review of systems from Patient History Form dated 3/24/20 and available in the patient's chart under the Media tab. Physical Examination:     Vital signs:   Temp 97.3 °F (36.3 °C)   Ht 5' 10\" (1.778 m)   Wt 214 lb 12.8 oz (97.4 kg)   BMI 30.82 kg/m²     General:  alert, appears stated age, cooperative and no distress   Gait:  Normal. The patient can bear weight on the injured extremity. Right Knee  Alignment:  neutral   ROM:  0 degrees extension to 120 degrees flexion   Bilateral knees   Crepitus:  no   Joint Tenderness:  medial joint line   Effusion:   20-30 cc   Patellar excursion:  2 of 4 quadrants    Patellar tilt test:  negative   Patellar facet tenderness:  negative medial   negative lateral   Trochlear tenderness:  negative   Anterior drawer test:  negative   Posterior drawer:   negative   Varus laxity at 30 degrees:  negative   Left knee: negative   Valgus laxity at 30 degrees:   negative   Left knee: negative   You's test:  mildly positive   Elizabeth's test:  negative     There is not any cellulitis, lymphedema or cutaneous lesions noted in the lower extremities. Motor exam of the lower extremities show quadriceps, hamstrings, foot dorsiflexion and plantarflexion grossly intact. Sensation to both feet is grossly intact to light touch. The bilateral lower extremities are warm and well-perfused with brisk capillary refill. Imaging:  Right Knee X-Ray: 3 view x-rays of the knee, including bilateral AP and sunrise and lateral of the affected side were  obtained and reviewed. No fracture and Normal alignment. Mild medial narrowing, but otherwise maintained joint spaces. Assessment:     Right knee mild osteoarthritis, possible degenerative medial meniscus tear      Plan:     Natural history and expected course discussed. Questions answered.   We discussed that I am suspicious for a degenerative medial meniscus tear. He does not have an effusion or mechanical symptoms. I recommend initial conservative management. Ice prn. NSAIDs prn. Discussed judicious use in light of current Covid19 pandemic and reports that NSAIDs could worsen disease if he contracts virus. The risks and benefits of an injection were discussed with the patient. The patient had full opportunity to ask questions and all were answered. The patient then provided verbal informed consent. The skin was then prepped with betadine solution and alcohol. Under aseptic conditions, the  right knee was injected with 4cc of 1% xylocaine, 4cc of 0.25% marcaine, and 1cc of Kenalog (40mg/ml). There were no immediate complications following the injection. The patient was advised of the possibility of injection site reaction and instructed to apply ice to the area and take NSAIDs if able. Activity modification / HEP discussed. We are in the middle of [place] pandemic. I had a discussion with the patient today in regards to the governor's directives for sheltering in place to decrease disease transmission. I discussed with the patient that given their age and/or medical comorbidities, they would be at higher risk for complicated disease and/or death if they contracted the virus. I recommended the patient take seriously these recommendations from medical and public health professionals and not be out for essential / emergent needs. Follow up 3 months prn.

## 2021-12-03 ENCOUNTER — OFFICE VISIT (OUTPATIENT)
Dept: CARDIOLOGY CLINIC | Age: 86
End: 2021-12-03
Payer: MEDICARE

## 2021-12-03 VITALS
SYSTOLIC BLOOD PRESSURE: 134 MMHG | HEIGHT: 70 IN | OXYGEN SATURATION: 97 % | HEART RATE: 76 BPM | BODY MASS INDEX: 29.28 KG/M2 | WEIGHT: 204.5 LBS | DIASTOLIC BLOOD PRESSURE: 72 MMHG

## 2021-12-03 DIAGNOSIS — E78.2 MIXED HYPERLIPIDEMIA: ICD-10-CM

## 2021-12-03 DIAGNOSIS — I10 ESSENTIAL HYPERTENSION: ICD-10-CM

## 2021-12-03 DIAGNOSIS — R07.89 OTHER CHEST PAIN: Primary | ICD-10-CM

## 2021-12-03 DIAGNOSIS — R93.1 AGATSTON CORONARY ARTERY CALCIUM SCORE BETWEEN 100 AND 400: ICD-10-CM

## 2021-12-03 PROCEDURE — 99214 OFFICE O/P EST MOD 30 MIN: CPT | Performed by: NURSE PRACTITIONER

## 2021-12-03 PROCEDURE — 93000 ELECTROCARDIOGRAM COMPLETE: CPT | Performed by: NURSE PRACTITIONER

## 2021-12-03 RX ORDER — FUROSEMIDE 20 MG/1
20 TABLET ORAL PRN
COMMUNITY

## 2021-12-03 RX ORDER — ROSUVASTATIN CALCIUM 5 MG/1
5 TABLET, COATED ORAL DAILY
COMMUNITY

## 2021-12-03 RX ORDER — POTASSIUM CHLORIDE 750 MG/1
10 TABLET, EXTENDED RELEASE ORAL PRN
COMMUNITY

## 2021-12-03 NOTE — PROGRESS NOTES
[address]     Outpatient Follow Up Note    Ar Freire is 82 y.o. male who presents today with a history of DOZIER with positive calcium score and HTN      His other hx includes: COVID-19 :  before going OOT to [place]. tx with remdesivir depsite being vaccinated    CHIEF COMPLAINT / HPI:  Follow Up secondary to CP    Subjective:   He still weak and feels tired since having the virus; and recovering from a sinus infx    he denies significant chest pain/discomfort/heaviness. He's noticed about a month ago, he had a little sharp pain go up his arm. It lasted a couple of minutes. He'd also had some pain in his left chest of brief duration. Both times he was watching TV. He doesn't think he had any discomfort when out of town. He may though have had it for a short time a month prior to leaving. He has no associated symptoms. There is no SOB/DOZIER. The patient denies orthopnea/PND. The patient has swelling in his Lt ankle. Its controlled wearing a support sock. He has lasix to take if needed; which he hasn't needed. The patients weight is down to 198 from 214# in 6 weeks d/t COVID. The patient is not experiencing palpitations or dizziness. These symptoms show are new since the last OV. His home BP runs 130-145/70-80  With regard to medication therapy the patient has been compliant with prescribed regimen. They have tolerated therapy to date.      Past Medical History:   Diagnosis Date    Cancer [facility])     bladder    Colitis     GERD (gastroesophageal reflux disease)     Hypertension      Social History:    Social History     Tobacco Use   Smoking Status Former Smoker    Packs/day: 0.50    Years: 15.00    Pack years: 7.50    Quit date: 1980    Years since quittin.4   Smokeless Tobacco Never Used     Current Medications:  Current Outpatient Medications   Medication Sig Dispense Refill    furosemide (LASIX) 20 MG tablet Take 20 mg by mouth as needed      potassium chloride (KLOR-CON M) 10 MEQ extended release tablet Take 10 mEq by mouth as needed      rosuvastatin (CRESTOR) 5 MG tablet Take 5 mg by mouth daily      amLODIPine (NORVASC) 10 MG tablet Take 10 mg by mouth nightly      AzaTHIOprine (IMURAN PO) Take 50 mg by mouth daily       Dihydroxyaluminum Sod Carb (ROLAIDS PO) Take 1 tablet by mouth 2 times daily as needed       tadalafil (CIALIS) 20 MG tablet Take 20 mg by mouth (Patient not taking: Reported on 12/3/2021)       No current facility-administered medications for this visit. REVIEW OF SYSTEMS:    CONSTITUTIONAL: + weight loss, + fatigue, - weakness, night sweats or fever. HEENT: No new vision difficulties or ringing in the ears. RESPIRATORY: No new SOB, PND, orthopnea or cough. CARDIOVASCULAR: See HPI  GI: No nausea, vomiting, diarrhea, constipation, abdominal pain or changes in bowel habits >> hx of colitis . : No urinary frequency, urgency, incontinence hematuria or dysuria. SKIN: No cyanosis or skin lesions. MUSCULOSKELETAL: No new muscle or joint pain; Lt arm pain x2 yrs. NEUROLOGICAL: No syncope or TIA-like symptoms. PSYCHIATRIC: No anxiety, pain, insomnia or depression    Objective:   PHYSICAL EXAM:    Vitals:    12/03/21 1147 12/03/21 1223   BP: 130/70 134/72   Site: Left Upper Arm Right Upper Arm   Position: Sitting    Cuff Size: Medium Adult Large Adult   Pulse: 76    SpO2: 97%    Weight: 204 lb 8 oz (92.8 kg)    Height: 5' 10\" (1.778 m)          VITALS:  /70 (Site: Left Upper Arm, Position: Sitting, Cuff Size: Medium Adult)   Pulse 76   Ht 5' 10\" (1.778 m)   Wt 204 lb 8 oz (92.8 kg)   SpO2 97%   BMI 29.34 kg/m²   CONSTITUTIONAL: Cooperative, no apparent distress, and appears well nourished / developed  NEUROLOGIC:  Awake and orientated to person, place and time. PSYCH: Calm affect. SKIN: Warm and dry.   HEENT: Sclera non-icteric, normocephalic, neck supple, no elevation of JVP, normal carotid pulses with no bruits and thyroid normal size. LUNGS:  No increased work of breathing and clear to auscultation, no crackles or wheezing  CARDIOVASCULAR:  Regular rate with ectopy 68 and rhythm with no murmurs, gallops, rubs, or abnormal heart sounds, normal PMI. The apical impulses not displaced  JVP less than 8 cm H2O  Heart tones are crisp and normal  Cervical veins are not engorged  The carotid upstroke is normal in amplitude and contour without delay or bruit  JVP is not elevated  ABDOMEN:  Normal bowel sounds, non-distended and non-tender to palpation  EXT: No edema, no calf tenderness. Pulses are present bilaterally. DATA:      LABS:     Specimen:  Blood  2/18/21 Ref Range & Units 9 mo ago   Glucose 65 - 99 mg/dL 93    BUN, Bld 8 - 27 mg/dL 23    Creatinine, Ser 0.76 - 1.27 mg/dL 1.40 High     EGFR IF NONAFRICN AM >59 mL/min/1.73 46 Low     eGFR  >59 mL/min/1.73 53 Low     BUN/Creatinine Ratio 10 - 24 16    Sodium 134 - 144 mmol/L 141    Potassium 3.5 - 5.2 mmol/L 4.8    Chloride 96 - 106 mmol/L 101    CARBON DIOXIDE 20 - 29 mmol/L 24    Calcium 8.6 - 10.2 mg/dL 9.2    Protein, Total, Serum 6.0 - 8.5 g/dL 7.1    Albumin 3.6 - 4.6 g/dL 4.1    Globulin, Total 1.5 - 4.5 g/dL 3.0    Albumin/Globulin Ratio 1.2 - 2.2 1.4    Bilirubin, Total 0.0 - 1.2 mg/dL 0.3    Alkaline Phosphatase, S 39 - 117 IU/L 76    AST 0 - 40 IU/L 13    ALT (SGPT) 0 - 44 IU/L 9        Component 11/23/21 08/18/21 02/18/21 07/07/20 04/19/18 10/26/16   Cholesterol, Total 130 208 High  206 High  176 187 200 High    Triglycerides 106 91 109 68 182 High  124   HDL Cholesterol 36 Low  38 Low  41 39 Low  37 Low     VLDL Cholesterol [place] 20 17 20 14 36 25   LDL CHOL CALC ([facility]) 74  153 High  145 High           11/30/21  CORONAVIRUS COVID19 Not Detected Not Detected        Radiology Review:  Pertinent images / reports were reviewed as a part of this visit and reveals the following:    Calcium score: May '19:  Calcium score of 546.57.  This value implies an extensive coronary plaque burden. There is a high likelihood of at least one significant coronary stenosis. Calcium score: The patient's total calcium score is 546.57. This score represents a summation of individual vessel calcium scores of :  96.97 in the left main  37.35 in the right coronary artery  147.62 in the left anterior descending artery  264.64 in the left circumflex  0 in the posterior descending artery. The calcium score GA  463.19 implies extensive coronary plaque burden and a high likelihood of at least one significant coronary stenosis. .    Stress echo: July '19:  Summary   Normal stress echocardiogram study. Rest      ECG   Normal sinus rhythm. Stress   Stress Type: Exercise   Stress Protocol: Marty      Rest HR: 80 bpm                           HR BP Product: [id number]   Rest BP: 124/70 mmHg                      Max Exercise: 5.4 METS   Stress Peak HR: 131 bpm   Stress Peak BP: 169/65 mmHg   Predicted HR: 138 bpm   % of predicted HR: 95   Test Duration: 4 min and 44 sec   Reason for Termination: Target heart rate      Results      Echo (rest): Normal (LVEF >50%)      Echo (stress): Hyperkinetic (LVEF >70%)      Echo   Baseline resting echocardiogram shows normal global LV systolic function   with an ejection fraction of 60% and uniform myocardial segmental wall   motion. Following stress there was uniform augmentation of all myocardial   segments with appropriate hyperdynamic LV systolic response to stress. ECG   Normal (Negative) response to exercise. Arrhythmias   Rare premature ventricular contractions. Echo: Summary   Normal left ventricle size and systolic function with an estimated ejection   fraction of 55%. Borderline concentric left ventricular hypertrophy is present. Diastolic filling parameters suggests grade I diastolic dysfunction. Aortic valve appears sclerotic but opens adequately. Assessment:      Diagnosis Orders   1.      Chest pain    ~atypical / typical description of discomfort in both LUE and chest   ~possible angina component with hx of elevated LDL and positive calcium score    2.     agatston coronary artery calcium score greater than 400   ~neg stress echo for ischemia    ~c/o rest chest pain of short duration     3. Essential hypertension   ~controlled    4. Mixed hyperlipidemia    ~LDL elevated at 153 before starting crestor  ~much improved and at goal on low dose 5 mg; had also lost 16# during time of illness            I had the opportunity to review the clinical symptoms and presentation of iKm Nunes. Plan:     1. Exercise myoview stress : CP / arm pain with RF for cor disease  2. F/U in four weeks / test dependent (if unremarkable NM, will see in six months)    Overall the patient is stable from CV standpoint    I have addresed the patient's cardiac risk factors and adjusted pharmacologic treatment as needed. In addition, I have reinforced the need for patient directed risk factor modification. Further evaluation will be based upon the patient's clinical course and testing results. All questions and concerns were addressed to the patient/daughter, Anh Norman . Alternatives to my treatment were discussed. The patient is not currently smoking. The risks related to smoking were reviewed with the patient. Recommend maintaining a smoke-free lifestyle. Patient is not on a beta-blocker : neg MI  Patient is not on an ace-i/ARB : neg CHF  Patient is on a statin     Dual Antiplatelet therapy / anti-coagulation has not been recommended / prescribed for this patient. The patient verbalizes understanding not to stop medications without discussing with us. Discussed exercise: 30-60 minutes 7 days/week  Discussed Low saturated fat diet. Thank you for allowing to us to participate in the care of Kim Nunes.     Sherryl Closs, APRN    Documentation of today's visit sent to PCP

## 2021-12-03 NOTE — PATIENT INSTRUCTIONS
Exercise myoview stress test : see if your chest and arm pain is from a change in circulation to your heart muscle    appt in four weeks

## 2021-12-17 ENCOUNTER — HOSPITAL ENCOUNTER (OUTPATIENT)
Dept: NON INVASIVE DIAGNOSTICS | Age: 86
Discharge: HOME | End: 2021-12-17
Payer: MEDICARE

## 2021-12-17 LAB
LV EF: 70 %
LVEF MODALITY: NORMAL

## 2021-12-17 PROCEDURE — 3430000000 HC RX DIAGNOSTIC RADIOPHARMACEUTICAL: Performed by: NURSE PRACTITIONER

## 2021-12-17 PROCEDURE — 78452 HT MUSCLE IMAGE SPECT MULT: CPT

## 2021-12-17 PROCEDURE — 93017 CV STRESS TEST TRACING ONLY: CPT | Performed by: INTERNAL MEDICINE

## 2021-12-17 PROCEDURE — A9502 TC99M TETROFOSMIN: HCPCS | Performed by: NURSE PRACTITIONER

## 2021-12-17 RX ADMIN — TETROFOSMIN 30 MILLICURIE: 1.38 INJECTION, POWDER, LYOPHILIZED, FOR SOLUTION INTRAVENOUS at 10:04

## 2021-12-17 RX ADMIN — TETROFOSMIN 10 MILLICURIE: 1.38 INJECTION, POWDER, LYOPHILIZED, FOR SOLUTION INTRAVENOUS at 08:21

## 2021-12-17 NOTE — PROGRESS NOTES
Patient instructed on Marty Protocol Stress Test Procedure including possible side effects and adverse reactions. Verbalizes knowledge and understanding and denies having any questions.

## 2023-03-07 ENCOUNTER — HOSPITAL ENCOUNTER (OUTPATIENT)
Dept: GENERAL RADIOLOGY | Age: 88
Discharge: HOME | End: 2023-03-07
Payer: MEDICARE

## 2023-03-07 ENCOUNTER — HOSPITAL ENCOUNTER (OUTPATIENT)
Age: 88
Discharge: HOME | End: 2023-03-07
Payer: MEDICARE

## 2023-03-07 DIAGNOSIS — Z85.51 HISTORY OF MALIGNANT NEOPLASM OF BLADDER: ICD-10-CM

## 2023-03-07 PROCEDURE — 74018 RADEX ABDOMEN 1 VIEW: CPT

## 2024-12-31 ENCOUNTER — OFFICE VISIT (OUTPATIENT)
Age: 89
End: 2024-12-31

## 2024-12-31 VITALS
BODY MASS INDEX: 30.28 KG/M2 | TEMPERATURE: 98.1 F | OXYGEN SATURATION: 94 % | DIASTOLIC BLOOD PRESSURE: 69 MMHG | HEART RATE: 74 BPM | WEIGHT: 211 LBS | SYSTOLIC BLOOD PRESSURE: 132 MMHG

## 2024-12-31 DIAGNOSIS — J01.00 ACUTE MAXILLARY SINUSITIS, RECURRENCE NOT SPECIFIED: Primary | ICD-10-CM

## 2024-12-31 RX ORDER — FLUTICASONE PROPIONATE 50 MCG
1 SPRAY, SUSPENSION (ML) NASAL DAILY
Qty: 16 G | Refills: 0 | Status: SHIPPED | OUTPATIENT
Start: 2024-12-31

## 2024-12-31 RX ORDER — AZITHROMYCIN 250 MG/1
TABLET, FILM COATED ORAL
Qty: 6 TABLET | Refills: 0 | Status: SHIPPED | OUTPATIENT
Start: 2024-12-31 | End: 2025-01-10

## 2024-12-31 ASSESSMENT — ENCOUNTER SYMPTOMS
SINUS COMPLAINT: 1
SINUS PRESSURE: 1

## 2024-12-31 NOTE — PROGRESS NOTES
Milton Rankin (:  1935) is a 89 y.o. male,New patient, here for evaluation of the following chief complaint(s):  Sinus Problem (Pt states sinus pressure. Pt states this is going on off and on for about month or so but since last week its been getting worse. ), Cold Symptoms, and Nasal Congestion      ASSESSMENT/PLAN:  1. Acute maxillary sinusitis, recurrence not specified  - azithromycin (ZITHROMAX) 250 MG tablet; 500mg on day 1 followed by 250mg on days 2 - 5  Dispense: 6 tablet; Refill: 0  - fluticasone (FLONASE) 50 MCG/ACT nasal spray; 1 spray by Each Nostril route daily  Dispense: 16 g; Refill: 0       Return if symptoms worsen or fail to improve.    SUBJECTIVE/OBJECTIVE:  Present to clinic with congestion and sinus pressure for one month. No fever      History provided by:  Patient  Sinus Problem  Associated symptoms include congestion and sinus pressure.   Cold Symptoms   Associated symptoms include congestion.       Vitals:    24 0847   BP: 132/69   Site: Left Upper Arm   Position: Sitting   Cuff Size: Medium Adult   Pulse: 74   Temp: 98.1 °F (36.7 °C)   TempSrc: Oral   SpO2: 94%   Weight: 95.7 kg (211 lb)       Review of Systems   HENT:  Positive for congestion and sinus pressure.        Physical Exam  Constitutional:       Appearance: Normal appearance.   HENT:      Head: Normocephalic and atraumatic.      Nose: Congestion present.      Mouth/Throat:      Mouth: Mucous membranes are moist.   Eyes:      Pupils: Pupils are equal, round, and reactive to light.   Pulmonary:      Effort: Pulmonary effort is normal.      Breath sounds: Normal breath sounds.   Musculoskeletal:         General: Normal range of motion.      Cervical back: Normal range of motion and neck supple.   Skin:     General: Skin is warm.   Neurological:      Mental Status: He is alert and oriented to person, place, and time.   Psychiatric:         Mood and Affect: Mood normal.         Behavior: Behavior normal.           An

## (undated) DEVICE — SET VLV 3 PC AWS DISPOSABLE GRDIAN SCOPEVALET

## (undated) DEVICE — FORCEPS BX L240CM WRK CHN 2.8MM STD CAP W/ NDL MIC MESH

## (undated) DEVICE — PROCEDURE KIT ENDOSCP CUST

## (undated) DEVICE — BW-412T DISP COMBO CLEANING BRUSH: Brand: SINGLE USE COMBINATION CLEANING BRUSH

## (undated) DEVICE — SOLUTION IV IRRIG WATER 500ML POUR BRL ST 2F7113

## (undated) DEVICE — 60 ML SYRINGE,REGULAR TIP: Brand: MONOJECT

## (undated) DEVICE — SPECIMEN TRAP: Brand: ARGYLE